# Patient Record
(demographics unavailable — no encounter records)

---

## 2024-11-23 NOTE — REVIEW OF SYSTEMS
[Fever] : fever [Malaise] : malaise [Sore Throat] : sore throat [Rash] : rash [Negative] : Genitourinary

## 2024-11-23 NOTE — HISTORY OF PRESENT ILLNESS
[de-identified] : Sore throat, fever, and lethargy since yesterday, with a positive strep test.  [FreeTextEntry6] : HISTORY OF PRESENT ILLNESS - Alexey started complaining of a sore throat on 2024-11-20. - Extremely lethargic, fell asleep early, and slept most of the day on 2024-11-21. - Fever of approximately 101.4F on the morning of 2024-11-21, reduced after taking Tylenol at 10 AM. - No rash observed, but a little blotchy skin noted. - No diarrhea, coughing, congestion, or runny nose. - No appetite due to sore throat. - Vomited on 2024-11-17 after consuming hot chocolate, but seemed slightly off since then.

## 2024-11-23 NOTE — DISCUSSION/SUMMARY
[FreeTextEntry1] : ASSESSMENT -strep tonsillopharyngitis indicated by sore throat, fever, and blotchy skin. - Confirmed streptococcal infection (strep positive). PLAN - Prescribe amoxicillin 6.5 milliliters twice a day for 10 days - Monitor for improvement in symptoms within 48 hours - Monitor for absence of fever 48 hours from now

## 2024-11-23 NOTE — PHYSICAL EXAM
[Pink Nasal Mucosa] : pink nasal mucosa [Clear to Auscultation Bilaterally] : clear to auscultation bilaterally [Regular Rate and Rhythm] : regular rate and rhythm [Murmur] : no murmur [de-identified] : very faint pink macular eruption on arms and torso [TextEntry] : HEENT: Throat very inflamed; tonsils slightly enlarged; ear tubes in place with no drainage or fluid.

## 2024-11-23 NOTE — HISTORY OF PRESENT ILLNESS
[de-identified] : Sore throat, fever, and lethargy since yesterday, with a positive strep test.  [FreeTextEntry6] : HISTORY OF PRESENT ILLNESS - Alexey started complaining of a sore throat on 2024-11-20. - Extremely lethargic, fell asleep early, and slept most of the day on 2024-11-21. - Fever of approximately 101.4F on the morning of 2024-11-21, reduced after taking Tylenol at 10 AM. - No rash observed, but a little blotchy skin noted. - No diarrhea, coughing, congestion, or runny nose. - No appetite due to sore throat. - Vomited on 2024-11-17 after consuming hot chocolate, but seemed slightly off since then.

## 2024-11-23 NOTE — PHYSICAL EXAM
[Pink Nasal Mucosa] : pink nasal mucosa [Clear to Auscultation Bilaterally] : clear to auscultation bilaterally [Regular Rate and Rhythm] : regular rate and rhythm [Murmur] : no murmur [de-identified] : very faint pink macular eruption on arms and torso [TextEntry] : HEENT: Throat very inflamed; tonsils slightly enlarged; ear tubes in place with no drainage or fluid.